# Patient Record
Sex: FEMALE | Race: WHITE
[De-identification: names, ages, dates, MRNs, and addresses within clinical notes are randomized per-mention and may not be internally consistent; named-entity substitution may affect disease eponyms.]

---

## 2018-06-13 ENCOUNTER — HOSPITAL ENCOUNTER (OUTPATIENT)
Dept: HOSPITAL 62 - LC | Age: 33
Discharge: HOME | End: 2018-06-13
Attending: OBSTETRICS & GYNECOLOGY
Payer: COMMERCIAL

## 2018-06-13 DIAGNOSIS — Z3A.33: ICD-10-CM

## 2018-06-13 DIAGNOSIS — O47.03: Primary | ICD-10-CM

## 2018-06-13 LAB
APPEARANCE UR: CLEAR
APTT PPP: (no result) S
BARBITURATES UR QL SCN: NEGATIVE
BILIRUB UR QL STRIP: NEGATIVE
GLUCOSE UR STRIP-MCNC: NEGATIVE MG/DL
KETONES UR STRIP-MCNC: NEGATIVE MG/DL
METHADONE UR QL SCN: NEGATIVE
NITRITE UR QL STRIP: NEGATIVE
PCP UR QL SCN: NEGATIVE
PH UR STRIP: 6 [PH] (ref 5–9)
PROT UR STRIP-MCNC: NEGATIVE MG/DL
SP GR UR STRIP: 1
URINE AMPHETAMINES SCREEN: NEGATIVE
URINE BENZODIAZEPINES SCREEN: NEGATIVE
URINE COCAINE SCREEN: NEGATIVE
URINE MARIJUANA (THC) SCREEN: NEGATIVE
UROBILINOGEN UR-MCNC: NEGATIVE MG/DL (ref ?–2)

## 2018-06-13 PROCEDURE — 80307 DRUG TEST PRSMV CHEM ANLYZR: CPT

## 2018-06-13 PROCEDURE — 59025 FETAL NON-STRESS TEST: CPT

## 2018-06-13 PROCEDURE — 81001 URINALYSIS AUTO W/SCOPE: CPT

## 2018-06-13 PROCEDURE — 4A1HXCZ MONITORING OF PRODUCTS OF CONCEPTION, CARDIAC RATE, EXTERNAL APPROACH: ICD-10-PCS | Performed by: OBSTETRICS & GYNECOLOGY

## 2018-06-13 NOTE — L&D PROGRESS NOTES
=================================================================

PROGRESS NOTES

=================================================================

Datetime Report Generated by CPN: 06/13/2018 18:29

   

   

=================================================================

PROGRESS NOTE

=================================================================

   

Comment:  VE: soft, closed, high, Cat 1 strip, feeling better, no uc's.

   at work today and felt funny, faint, concerned and came here.  Works

   here un [employer], lives in [place], goes to Dr. in [place]

   + FM

   Keep appt with Dr. on Friday

   Rev S_S to report

   

=================================================================

SIGNATURE

=================================================================

   

SIGNATURE:  [id number]

Assignment:  Diana Chase MD

Signature:  Electronically signed by Shruti Gomes CNM on 6/13/2018 at

   18:27  with User ID: [email or web address]

:  Electronically signed by Shruti Gomes CNM on 6/13/2018 at 18:27  with

   User ID: [email or web address]

## 2018-06-13 NOTE — NON STRESS TEST REPORT
=================================================================

Non Stress Test

=================================================================

Datetime Report Generated by CPN: 06/13/2018 19:35

   

   

=================================================================

DEMOGRAPHIC

=================================================================

   

EGA NST:  33.4

   

=================================================================

INDICATION

=================================================================

   

Indication for Study:  Ordered by Provider

   

=================================================================

MONITORING

=================================================================

   

Monitor Explained:  Monitor Explained; Test Explained; Patient

   Verbalized Understanding

Time on Monitor:  06/13/2018 17:10

Time off Monitor:  06/13/2018 18:16

NST Duration:  66

   

=================================================================

NST INTERVENTIONS

=================================================================

   

NST Interventions:  PO Hydration

Physician Notified NST:  Dr. Chase and JJocelyn Gomes, CNM

BABY A:  [id number]

   

=================================================================

BABY A

=================================================================

   

Fetal Movement :  Present

Contraction Frequency :  none

FHR Baseline :  130

Accelerations :  15X15

Decelerations :  None

Variability :  Moderate 6-25bpm

NST Review:  Meets Criteria for Reactive NST

NST Review and Verified By :  HANNAH MARTIN Results:  Reactive

   

=================================================================

NST REPORT

=================================================================

   

Report Trigger:  Send Report